# Patient Record
Sex: FEMALE | Race: WHITE | NOT HISPANIC OR LATINO | URBAN - METROPOLITAN AREA
[De-identification: names, ages, dates, MRNs, and addresses within clinical notes are randomized per-mention and may not be internally consistent; named-entity substitution may affect disease eponyms.]

---

## 2021-07-19 ENCOUNTER — TELEPHONE ENCOUNTER (OUTPATIENT)
Dept: URBAN - METROPOLITAN AREA CLINIC 92 | Facility: CLINIC | Age: 46
End: 2021-07-19

## 2021-07-19 ENCOUNTER — LAB OUTSIDE AN ENCOUNTER (OUTPATIENT)
Dept: URBAN - METROPOLITAN AREA CLINIC 124 | Facility: CLINIC | Age: 46
End: 2021-07-19

## 2021-07-19 ENCOUNTER — WEB ENCOUNTER (OUTPATIENT)
Dept: URBAN - METROPOLITAN AREA CLINIC 25 | Facility: CLINIC | Age: 46
End: 2021-07-19

## 2021-07-19 ENCOUNTER — OFFICE VISIT (OUTPATIENT)
Dept: URBAN - METROPOLITAN AREA CLINIC 124 | Facility: CLINIC | Age: 46
End: 2021-07-19
Payer: COMMERCIAL

## 2021-07-19 DIAGNOSIS — R74.8 ABNORMAL LIVER ENZYMES: ICD-10-CM

## 2021-07-19 DIAGNOSIS — R68.81 EARLY SATIETY: ICD-10-CM

## 2021-07-19 DIAGNOSIS — R13.10 ESOPHAGEAL DYSPHAGIA: ICD-10-CM

## 2021-07-19 DIAGNOSIS — R74.8 ELEVATED LIVER ENZYMES: ICD-10-CM

## 2021-07-19 DIAGNOSIS — R10.84 GENERALIZED ABDOMINAL PAIN: ICD-10-CM

## 2021-07-19 DIAGNOSIS — M06.9 RHEUMATOID ARTHRITIS, INVOLVING UNSPECIFIED SITE, UNSPECIFIED WHETHER RHEUMATOID FACTOR PRESENT: ICD-10-CM

## 2021-07-19 PROCEDURE — 80074 ACUTE HEPATITIS PANEL: CPT | Performed by: INTERNAL MEDICINE

## 2021-07-19 PROCEDURE — 86376 MICROSOMAL ANTIBODY EACH: CPT | Performed by: INTERNAL MEDICINE

## 2021-07-19 PROCEDURE — 99204 OFFICE O/P NEW MOD 45 MIN: CPT | Performed by: INTERNAL MEDICINE

## 2021-07-19 PROCEDURE — 82784 ASSAY IGA/IGD/IGG/IGM EACH: CPT | Performed by: INTERNAL MEDICINE

## 2021-07-19 RX ORDER — LEVOTHYROXINE SODIUM 0.05 MG/1
1 TABLET IN THE MORNING ON AN EMPTY STOMACH TABLET ORAL ONCE A DAY
Status: ACTIVE | COMMUNITY

## 2021-07-19 RX ORDER — PHENTERMINE HYDROCHLORIDE 37.5 MG/1
1 TABLET TABLET ORAL ONCE A DAY
Status: ACTIVE | COMMUNITY

## 2021-07-19 RX ORDER — BUPROPION HYDROCHLORIDE 300 MG/1
1 TABLET IN THE MORNING TABLET, EXTENDED RELEASE ORAL ONCE A DAY
Status: ACTIVE | COMMUNITY

## 2021-07-19 NOTE — PHYSICAL EXAM GASTROINTESTINAL
Abdomen , soft, obese, nontender, nondistended , no guarding or rigidity , no masses palpable , normal bowel sounds , Liver and Spleen , no hepatomegaly present , liver nontender , spleen not palpable

## 2021-07-19 NOTE — PHYSICAL EXAM CONSTITUTIONAL:
well developed, well nourished , in no acute distress , , normal communication ability, extremely anxious

## 2021-07-26 ENCOUNTER — TELEPHONE ENCOUNTER (OUTPATIENT)
Dept: URBAN - METROPOLITAN AREA CLINIC 92 | Facility: CLINIC | Age: 46
End: 2021-07-26

## 2021-07-26 LAB
A/G RATIO: 1.6
ACTIN (SMOOTH MUSCLE) ANTIBODY: 6
ALBUMIN: 4.3
ALKALINE PHOSPHATASE: 72
ALPHA-1-ANTITRYPSIN, SERUM: 118
ALT (SGPT): 48
ANA DIRECT: NEGATIVE
AST (SGOT): 44
BASO (ABSOLUTE): 0.1
BASOS: 1
BILIRUBIN, TOTAL: 0.2
BUN/CREATININE RATIO: 10
BUN: 11
CALCIUM: 8.8
CARBON DIOXIDE, TOTAL: 24
CERULOPLASMIN: 23.8
CHLORIDE: 101
CREATININE: 1.12
EGFR IF AFRICN AM: 68
EGFR IF NONAFRICN AM: 59
ENDOMYSIAL ANTIBODY IGA: NEGATIVE
EOS (ABSOLUTE): 0.1
EOS: 2
FERRITIN, SERUM: 44
GLOBULIN, TOTAL: 2.7
GLUCOSE: 80
HBSAG SCREEN: NEGATIVE
HEMATOCRIT: 39.3
HEMATOLOGY COMMENTS:: (no result)
HEMOGLOBIN: 12.6
HEP A AB, IGM: NEGATIVE
HEP B CORE AB, IGM: NEGATIVE
HEP C VIRUS AB: <0.1
IMMATURE CELLS: (no result)
IMMATURE GRANS (ABS): 0
IMMATURE GRANULOCYTES: 0
IMMUNOGLOBULIN A, QN, SERUM: 279
IMMUNOGLOBULIN G, QN, SERUM: 1057
IMMUNOGLOBULIN M, QN, SERUM: 179
INR: 1
IRON BIND.CAP.(TIBC): 339
IRON SATURATION: 18
IRON: 62
LIVER-KIDNEY MICROSOMAL AB: 0.8
LYMPHS (ABSOLUTE): 2.3
LYMPHS: 35
MCH: 30.1
MCHC: 32.1
MCV: 94
MITOCHONDRIAL (M2) ANTIBODY: <20
MONOCYTES(ABSOLUTE): 0.7
MONOCYTES: 10
NEUTROPHILS (ABSOLUTE): 3.5
NEUTROPHILS: 52
NRBC: (no result)
PLATELETS: 234
POTASSIUM: 3.9
PROTEIN, TOTAL: 7
PROTHROMBIN TIME: 10.2
RBC: 4.18
RDW: 12.3
SODIUM: 137
T-TRANSGLUTAMINASE (TTG) IGA: <2
UIBC: 277
WBC: 6.6

## 2021-07-27 ENCOUNTER — TELEPHONE ENCOUNTER (OUTPATIENT)
Dept: URBAN - METROPOLITAN AREA CLINIC 25 | Facility: CLINIC | Age: 46
End: 2021-07-27

## 2021-07-27 RX ORDER — POLYETHYLENE GLYCOL 3350, SODIUM SULFATE, SODIUM CHLORIDE, POTASSIUM CHLORIDE, ASCORBIC ACID, SODIUM ASCORBATE 140-9-5.2G
AS DIRECTED KIT ORAL ONCE
Qty: 1 KIT | Refills: 0 | OUTPATIENT
Start: 2021-07-28 | End: 2021-07-29

## 2021-07-28 ENCOUNTER — LAB OUTSIDE AN ENCOUNTER (OUTPATIENT)
Dept: URBAN - METROPOLITAN AREA CLINIC 25 | Facility: CLINIC | Age: 46
End: 2021-07-28

## 2021-07-28 PROBLEM — 40890009 ESOPHAGEAL DYSPHAGIA: Status: ACTIVE | Noted: 2021-07-19

## 2021-07-28 PROBLEM — 428283002 HISTORY OF POLYP OF COLON (SITUATION): Status: ACTIVE | Noted: 2021-07-28

## 2021-08-03 ENCOUNTER — TELEPHONE ENCOUNTER (OUTPATIENT)
Dept: URBAN - METROPOLITAN AREA CLINIC 25 | Facility: CLINIC | Age: 46
End: 2021-08-03

## 2021-08-25 ENCOUNTER — OFFICE VISIT (OUTPATIENT)
Dept: URBAN - METROPOLITAN AREA SURGERY CENTER 16 | Facility: SURGERY CENTER | Age: 46
End: 2021-08-25
Payer: COMMERCIAL

## 2021-08-25 ENCOUNTER — CLAIMS CREATED FROM THE CLAIM WINDOW (OUTPATIENT)
Dept: URBAN - METROPOLITAN AREA CLINIC 4 | Facility: CLINIC | Age: 46
End: 2021-08-25
Payer: COMMERCIAL

## 2021-08-25 DIAGNOSIS — K31.89 OTHER DISEASES OF STOMACH AND DUODENUM, GASTRIC POLYPS: ICD-10-CM

## 2021-08-25 DIAGNOSIS — K21.9 GASTRO-ESOPHAGEAL REFLUX DISEASE WITHOUT ESOPHAGITIS: ICD-10-CM

## 2021-08-25 DIAGNOSIS — K21.9 ACID REFLUX: ICD-10-CM

## 2021-08-25 DIAGNOSIS — Z86.010 ADENOMAS PERSONAL HISTORY OF COLONIC POLYPS: ICD-10-CM

## 2021-08-25 PROCEDURE — 43239 EGD BIOPSY SINGLE/MULTIPLE: CPT | Performed by: INTERNAL MEDICINE

## 2021-08-25 PROCEDURE — 45378 DIAGNOSTIC COLONOSCOPY: CPT | Performed by: INTERNAL MEDICINE

## 2021-08-25 PROCEDURE — 88305 TISSUE EXAM BY PATHOLOGIST: CPT | Performed by: PATHOLOGY

## 2021-08-25 PROCEDURE — G8907 PT DOC NO EVENTS ON DISCHARG: HCPCS | Performed by: INTERNAL MEDICINE

## 2021-08-25 RX ORDER — BUPROPION HYDROCHLORIDE 300 MG/1
1 TABLET IN THE MORNING TABLET, EXTENDED RELEASE ORAL ONCE A DAY
Status: ACTIVE | COMMUNITY

## 2021-08-25 RX ORDER — PHENTERMINE HYDROCHLORIDE 37.5 MG/1
1 TABLET TABLET ORAL ONCE A DAY
Status: ACTIVE | COMMUNITY

## 2021-08-25 RX ORDER — LEVOTHYROXINE SODIUM 0.05 MG/1
1 TABLET IN THE MORNING ON AN EMPTY STOMACH TABLET ORAL ONCE A DAY
Status: ACTIVE | COMMUNITY

## 2021-09-01 ENCOUNTER — TELEPHONE ENCOUNTER (OUTPATIENT)
Dept: URBAN - METROPOLITAN AREA CLINIC 92 | Facility: CLINIC | Age: 46
End: 2021-09-01

## 2021-12-12 NOTE — HPI-TODAY'S VISIT:
July 2021:  pt is here with , has severe anxiety issues.   EGD - gastritis  and colonoscopy ( polyps were found ) in Nov 2018 - repeat advised in 5 yrs.   may 2021 -  ast 48 , alt 88, normal t bili 0.4, alp 80,  plt 281, hb 12.2.   No previous diagnosis of abnormal Liver enzymes.    on treatment for RA. Dr Mendoza wants pt to start pt on Humira. Stopped Methotrexate in March 2021. Lower dose was tried in early april but was just taken off completely due to liver enzymes. COmpleted Prednisone in May 2021.  US Liver was normal as per pt ( may - june 2021).  On Phenteramine since April 2019.  Chronic IBS. Changing diet has helped. Avoiding gluten. Regular bowels. Feels vague discomfort in lower abdomen.   on multiple psych meds. going to increase dose of wellbutrin.  Intermittent dysphagia - to both solids and liquids. started 3 months ago.   Seeing cardiologist ( dr. kyle perez).  reports weight gain for which I advised her to fu with endocrine.
No

## 2022-04-19 ENCOUNTER — WEB ENCOUNTER (OUTPATIENT)
Dept: URBAN - METROPOLITAN AREA CLINIC 74 | Facility: CLINIC | Age: 47
End: 2022-04-19

## 2022-04-20 ENCOUNTER — OFFICE VISIT (OUTPATIENT)
Dept: URBAN - METROPOLITAN AREA CLINIC 74 | Facility: CLINIC | Age: 47
End: 2022-04-20
Payer: COMMERCIAL

## 2022-04-20 DIAGNOSIS — M06.9 RHEUMATOID ARTHRITIS INVOLVING MULTIPLE SITES, UNSPECIFIED WHETHER RHEUMATOID FACTOR PRESENT: ICD-10-CM

## 2022-04-20 DIAGNOSIS — R74.01 TRANSAMINITIS: ICD-10-CM

## 2022-04-20 PROBLEM — 287006005: Status: ACTIVE | Noted: 2022-04-20

## 2022-04-20 PROCEDURE — 99203 OFFICE O/P NEW LOW 30 MIN: CPT | Performed by: STUDENT IN AN ORGANIZED HEALTH CARE EDUCATION/TRAINING PROGRAM

## 2022-04-20 RX ORDER — LEVOTHYROXINE SODIUM 0.05 MG/1
1 TABLET IN THE MORNING ON AN EMPTY STOMACH TABLET ORAL ONCE A DAY
Status: ACTIVE | COMMUNITY

## 2022-04-20 RX ORDER — BUPROPION HYDROCHLORIDE 300 MG/1
1 TABLET IN THE MORNING TABLET, EXTENDED RELEASE ORAL ONCE A DAY
Status: ACTIVE | COMMUNITY

## 2022-04-20 RX ORDER — PHENTERMINE HYDROCHLORIDE 37.5 MG/1
1 TABLET TABLET ORAL ONCE A DAY
Status: DISCONTINUED | COMMUNITY

## 2022-04-20 NOTE — HPI-TODAY'S VISIT:
46-year-old female New to me Previously seen by other ED providers Had EGD and colonoscopy in 2021.  Patient was recommended to repeat colonoscopy in 3 to 5 years due to history of polyp. Previously patient has been seen also in reference to abnormal liver function test.  They involve transaminitis of less than 2-3 times the upper limit of normal.  Other liver function test have always remained within normal limit.  Patient has extensive blood work related to abnormal liver function test which have remained negative for all alternative etiology. Patient has history of methotrexate exposure in the setting of rheumatoid arthritis which was stopped last year in March.  Patient also has been given a trial of Humira but it appeared that her liver function test up trended and hence it has been again stopped in March of this year. Patient has no signs of chronic liver disease.  No signs of portal hypertension no signs of liver failure.

## 2022-10-10 ENCOUNTER — OFFICE VISIT (OUTPATIENT)
Dept: URBAN - METROPOLITAN AREA CLINIC 74 | Facility: CLINIC | Age: 47
End: 2022-10-10

## 2022-11-08 ENCOUNTER — WEB ENCOUNTER (OUTPATIENT)
Dept: URBAN - METROPOLITAN AREA CLINIC 74 | Facility: CLINIC | Age: 47
End: 2022-11-08

## 2022-11-11 ENCOUNTER — DASHBOARD ENCOUNTERS (OUTPATIENT)
Age: 47
End: 2022-11-11

## 2022-11-14 ENCOUNTER — OFFICE VISIT (OUTPATIENT)
Dept: URBAN - METROPOLITAN AREA CLINIC 74 | Facility: CLINIC | Age: 47
End: 2022-11-14

## 2022-11-14 RX ORDER — BUPROPION HYDROCHLORIDE 300 MG/1
1 TABLET IN THE MORNING TABLET, EXTENDED RELEASE ORAL ONCE A DAY
Status: ACTIVE | COMMUNITY

## 2022-11-14 RX ORDER — LEVOTHYROXINE SODIUM 0.05 MG/1
1 TABLET IN THE MORNING ON AN EMPTY STOMACH TABLET ORAL ONCE A DAY
Status: ACTIVE | COMMUNITY

## 2025-04-17 ENCOUNTER — OFFICE VISIT (OUTPATIENT)
Dept: URBAN - METROPOLITAN AREA CLINIC 40 | Facility: CLINIC | Age: 50
End: 2025-04-17

## 2025-06-05 ENCOUNTER — OFFICE VISIT (OUTPATIENT)
Dept: URBAN - METROPOLITAN AREA CLINIC 40 | Facility: CLINIC | Age: 50
End: 2025-06-05

## 2025-06-12 ENCOUNTER — OFFICE VISIT (OUTPATIENT)
Dept: URBAN - METROPOLITAN AREA CLINIC 40 | Facility: CLINIC | Age: 50
End: 2025-06-12

## 2025-07-10 ENCOUNTER — OFFICE VISIT (OUTPATIENT)
Dept: URBAN - METROPOLITAN AREA CLINIC 40 | Facility: CLINIC | Age: 50
End: 2025-07-10